# Patient Record
(demographics unavailable — no encounter records)

---

## 2024-12-19 NOTE — PHYSICAL EXAM
[Tired appearing] : tired appearing [Clear Rhinorrhea] : clear rhinorrhea [Transmitted Upper Airway Sounds] : transmitted upper airway sounds [NL] : warm, clear

## 2024-12-19 NOTE — DISCUSSION/SUMMARY
[FreeTextEntry1] : MARCIAL is a 4 year M with URI - Influenza A Acetaminophen (160mg/5ml) 7.5ml administered in office at 1330  Flu: Recommend supportive care including antipyretics, fluids, OTC cough/cold medications if age-appropriate, and nasal saline followed by nasal suction. Discussed risks/benefits of Tamiflu. Patient to be brought to the ED if has persistent decreased oral intake, decrease in wet diapers, fever >100.4F or becomes patient becomes lethargic or changed in mental status and alertness. To note if fever > 5 days must be seen immediately either in clinic or in ED.  Caretaker expressed understanding of the plan and agrees. No other concerns or questions today.

## 2024-12-19 NOTE — HISTORY OF PRESENT ILLNESS
[FreeTextEntry6] : since yesterday with fever tmax 103f Acetaminophen prn  cough, congestion, runny nose  body aches, seems more tired Decreased po intake however hydrating well  No vomiting, diarrhea, sob or difficulty breathing, joint pain or swelling, rash, urinary symptoms, headaches, ear pain or tugging